# Patient Record
Sex: FEMALE | Race: OTHER | HISPANIC OR LATINO | URBAN - METROPOLITAN AREA
[De-identification: names, ages, dates, MRNs, and addresses within clinical notes are randomized per-mention and may not be internally consistent; named-entity substitution may affect disease eponyms.]

---

## 2019-03-25 ENCOUNTER — EMERGENCY (EMERGENCY)
Facility: HOSPITAL | Age: 24
LOS: 1 days | Discharge: HOME | End: 2019-03-25
Attending: EMERGENCY MEDICINE
Payer: MEDICAID

## 2019-03-25 VITALS
OXYGEN SATURATION: 98 % | WEIGHT: 160.06 LBS | SYSTOLIC BLOOD PRESSURE: 127 MMHG | DIASTOLIC BLOOD PRESSURE: 89 MMHG | TEMPERATURE: 98 F | RESPIRATION RATE: 18 BRPM | HEART RATE: 85 BPM

## 2019-03-25 VITALS
TEMPERATURE: 98 F | OXYGEN SATURATION: 100 % | RESPIRATION RATE: 17 BRPM | SYSTOLIC BLOOD PRESSURE: 122 MMHG | DIASTOLIC BLOOD PRESSURE: 74 MMHG | HEART RATE: 81 BPM

## 2019-03-25 PROCEDURE — 99283 EMERGENCY DEPT VISIT LOW MDM: CPT

## 2019-03-25 PROCEDURE — 73610 X-RAY EXAM OF ANKLE: CPT | Mod: 26,LT

## 2019-03-25 PROCEDURE — 99283 EMERGENCY DEPT VISIT LOW MDM: CPT | Mod: 25

## 2019-03-25 PROCEDURE — 73610 X-RAY EXAM OF ANKLE: CPT

## 2019-03-25 RX ORDER — IBUPROFEN 200 MG
600 TABLET ORAL ONCE
Qty: 0 | Refills: 0 | Status: COMPLETED | OUTPATIENT
Start: 2019-03-25 | End: 2019-03-25

## 2019-03-25 RX ADMIN — Medication 600 MILLIGRAM(S): at 11:54

## 2019-03-25 RX ADMIN — Medication 600 MILLIGRAM(S): at 13:43

## 2019-03-25 NOTE — ED PROVIDER NOTE - PROGRESS NOTE DETAILS
No fracture, will air cast and Pt is well appearing walking with steady gait, stable for discharge and follow up without fail with medical doctor. I had a detailed discussion with the patient and/or guardian regarding the historical points, exam findings, and any diagnostic results supporting the discharge diagnosis. Pt educated on care and need for follow up. Strict return instructions and red flag signs and symptoms discussed with patient. Questions answered. Pt shows understanding of discharge information and agrees to follow.

## 2019-03-25 NOTE — ED PROVIDER NOTE - PHYSICAL EXAMINATION
L ankle: normal calf squeeze; tenderness to posterior tip of L lateral malleolus; minimal swelling, no bony tenderness to L foot

## 2019-03-25 NOTE — ED ADULT NURSE NOTE - NSIMPLEMENTINTERV_GEN_ALL_ED
Implemented All Universal Safety Interventions:  Rover to call system. Call bell, personal items and telephone within reach. Instruct patient to call for assistance. Room bathroom lighting operational. Non-slip footwear when patient is off stretcher. Physically safe environment: no spills, clutter or unnecessary equipment. Stretcher in lowest position, wheels locked, appropriate side rails in place.

## 2019-03-25 NOTE — ED PROVIDER NOTE - CLINICAL SUMMARY MEDICAL DECISION MAKING FREE TEXT BOX
22 y/o F presents to the ED with L ankle inversion injury x today. Concern for fibula fx so will xray and reassess.

## 2019-03-25 NOTE — ED PROVIDER NOTE - OBJECTIVE STATEMENT
22 y/o F with no significant PMHx and no significant PSHX presents to the ED with c/o L ankle inversion injury while walking downs steps x today. Pt denies head strike, pain elsewhere, or any other complaints. NKDA.

## 2019-09-19 NOTE — ED PROVIDER NOTE - CPE EDP CARDIAC NORM
Relevant Problems   No relevant active problems       Anesthetic History               Review of Systems / Medical History  Patient summary reviewed, nursing notes reviewed and pertinent labs reviewed    Pulmonary                   Neuro/Psych              Cardiovascular    Hypertension: well controlled              Exercise tolerance: >4 METS     GI/Hepatic/Renal                Endo/Other        Cancer (rectal)     Other Findings   Comments: Chronic hypokalemia    Hemorrhoids           Physical Exam    Airway  Mallampati: II  TM Distance: 4 - 6 cm  Neck ROM: decreased range of motion   Mouth opening: Normal     Cardiovascular  Regular rate and rhythm,  S1 and S2 normal,  no murmur, click, rub, or gallop             Dental    Dentition: Full lower dentures and Full upper dentures     Pulmonary  Breath sounds clear to auscultation               Abdominal  GI exam deferred       Other Findings            Anesthetic Plan    ASA: 3  Anesthesia type: general            Anesthetic plan and risks discussed with: Patient    Friend \"Glory\" present
normal...

## 2024-06-25 NOTE — ED ADULT NURSE NOTE - NS ED NURSE DISCH DISPOSITION
Ambulatory Visit  Name: Ben Rivas      : 1981      MRN: [id number]  Encounter Provider: Ja Leary MD  Encounter Date: 2024   Encounter department: [facility] GYNECOLOG[facility] [place]    Assessment & Plan     Pelvic pain x 1 to 2 months.  Increased menstrual cramping  Plan: Recommend 600 mg of Motrin every 4-6 hours during the first 2 days of her menstrual cycle.  Birth control pill discussed.  Check pelvic ultrasound.    1. Pelvic pain  -     US pelvis complete w transvaginal; Future; Expected date: 2024  2. Severe menstrual cramps  -     Ambulatory Referral to Obstetrics / Gynecology  -     US pelvis complete w transvaginal; Future; Expected date: 2024      History of Present Illness     Patient is a 42 y.o. female who presents to the office as a new patient complaining of a 1 to 2-month history of severe lower abdominal cramping worse with her menses.  States that her cycles are every 21 to 25 days.  Does take ibuprofen 200 mg every 6 6 hours with little relief.  Her NP was Hannah 10, 2024 but x 6 days.  Has not been sexually active since January.  Followed by her GYN at [facility].  Plans to continue to do so.  Had a normal Pap smear and cultures at last visit.  Has a history of fibromyalgia IBS chronic constipation and chronic migraine.    Patient has been trying to conceive for the last few years.  Patient has not been evaluated by reproductive endocrinology.  Discussed her age and possibility diminished ovarian reserve.  Has not been on folic acid or multivitamin.    Has been constipated over the last few weeks also.  Is followed by her family physician and GI.    Had a big ultrasound  that showed a right ovary with a 4.6 x 4.8 complex cyst.  Patient states that it resolved with dietary changes.  She has had a follow-up normal abdominal pelvic CT scan 2023.        Objective     There were no vitals taken for this visit.    Physical Exam  Genitourinary:      Vagina: Normal.      Cervix: Normal.      Uterus: Normal.       Adnexa:         Right: Tenderness present.         Left: Tenderness present.       Comments: Abdomen soft nontender.  External genitalia normal.  Normal urethra and Coleraine's glands.  No uterine prolapse cystocele or rectocele.  Excellent muscle tone.  Stool felt in the rectum on vaginal exam.      Administrative Statements            Discharged

## 2024-08-19 NOTE — ED PROVIDER NOTE - NS HIV RISK FACTOR YES
Please call patient with results of thyroid ultrasound  It showed decreased in size right thyroid nodule  No significant change in thyroid nodules, she has several  Per radiology recommendations given 6-year stability no further follow-up with ultrasound is recommended
Declined